# Patient Record
Sex: MALE | Race: WHITE
[De-identification: names, ages, dates, MRNs, and addresses within clinical notes are randomized per-mention and may not be internally consistent; named-entity substitution may affect disease eponyms.]

---

## 2020-09-21 ENCOUNTER — HOSPITAL ENCOUNTER (EMERGENCY)
Dept: HOSPITAL 7 - FB.ED | Age: 25
Discharge: HOME | End: 2020-09-21
Payer: COMMERCIAL

## 2020-09-21 DIAGNOSIS — E66.9: ICD-10-CM

## 2020-09-21 DIAGNOSIS — H16.133: Primary | ICD-10-CM

## 2020-09-21 NOTE — EDM.PDOC
ED HPI GENERAL MEDICAL PROBLEM





- General


Chief Complaint: Eye Problems


Stated Complaint: eyes


Time Seen by Provider: 09/21/20 03:00


Source of Information: Reports: Patient


History Limitations: Reports: No Limitations





- History of Present Illness


INITIAL COMMENTS - FREE TEXT/NARRATIVE: 





Shon awoke this am with pain, burning, and excessive tearing of eyes. He 

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 There is no other known exposure. He has tried no meds. 


  ** Bilateral Eye


Pain Score (Numeric/FACES): 5





- Related Data


                                    Allergies











Allergy/AdvReac Type Severity Reaction Status Date / Time


 


No Known Allergies Allergy   Verified 09/21/20 02:38











Home Meds: 


                                    Home Meds





NK [No Known Home Meds]  09/21/20 [History]











Past Medical History





- Past Health History


Medical/Surgical History: Denies Medical/Surgical History





Social & Family History





- Tobacco Use


Smoking Status *Q: Never Smoker





ED ROS GENERAL





- Review of Systems


Review Of Systems: Comprehensive ROS is negative, except as noted in HPI.





ED EXAM GENERAL W FULL EYE





- Physical Exam


Exam: See Below


Exam Limited By: Physical Impairment


General Appearance: Alert, WD/WN, Mild Distress, Obese


Eye Exam: Bilateral Eye: Conjunctival Injection, EOMI, Normal Fundi, PERRL


Visual Acuity (R) 20/: 20


Visual Acuity (L) 20/: 20


With Correction: No


Eyelids: Bilateral: Normal Appearance


Conjunctiva & Sclera: Bilateral: Conjunctival Edema, Injected


Cornea Exam: Bilateral: Normal Appearance, Examined with Flourescein


Extraocular Movements: Bilateral: Intact


Pupils: Normal Accommodation


Pupillary Size: Bilateral: 3 mm


Pupillary Reaction: Bilateral: Brisk


Anterior Chamber: Bilateral: Normal Appearance


Posterior Chamber: Bilateral: Normal Funduscopic


Ears: Normal External Exam


Nose: Normal Inspection


Throat/Mouth: Normal Inspection, Normal Oropharynx


Head: Normocephalic


Neck: Normal Inspection


Respiratory/Chest: Lungs Clear


Cardiovascular: Regular Rate, Rhythm


Back Exam: Normal Inspection


Extremities: Normal Inspection


Neurological: Alert, Oriented, CN II-XII Intact, Normal Cognition


Skin Exam: Warm, Dry, Intact, Normal Color


Lymphatic: No Adenopathy





Course





- Vital Signs


Text/Narrative:: 





Following assessment of both eyes, I administered 1 gtt of 1% Cyclogyl, Triple 

Antibx Oph Oint, and patched both eyes for the next 6 hours. 


Last Recorded V/S: 





                                Last Vital Signs











Temp  36.6 C   09/21/20 02:43


 


Pulse  66   09/21/20 02:43


 


Resp  16   09/21/20 02:43


 


BP  132/85   09/21/20 02:43


 


Pulse Ox  98   09/21/20 02:43














Departure





- Departure


Time of Disposition: 03:19


Disposition: Home, Self-Care 01


Condition: Fair


Clinical Impression: 


 Welders' keratitis of both eyes








- Discharge Information


*PRESCRIPTION DRUG MONITORING PROGRAM REVIEWED*: Not Applicable


*COPY OF PRESCRIPTION DRUG MONITORING REPORT IN PATIENT DAYSI: Not Applicable


Instructions:  Ultraviolet Keratitis, Easy-to-Read


Referrals: 


PCP,None [Primary Care Provider] - 


Forms:  ED Department Discharge


Additional Instructions: 


Leave patches on for 6 hours.


Utilize eye ointment as needed once to twice daily.


Ibuprofen 800mg every 8 hours as needed for pain. 


Continue to wear sunglasses. 





Sepsis Event Note (ED)





- Evaluation


Sepsis Screening Result: No Definite Risk





- Focused Exam


Vital Signs: 





                                   Vital Signs











  Temp Pulse Resp BP Pulse Ox


 


 09/21/20 02:43  36.6 C  66  16  132/85  98














- Problem List & Annotations


(1) Welders' keratitis of both eyes


SNOMED Code(s): 0285051


   Code(s): H16.133 - PHOTOKERATITIS, BILATERAL   Status: Acute   Current Visit:

Yes   Annotation/Comment:: He may remove patches in 6 hours, take Ibufprofen 600

mg po every 6 hurs, wear sun glsses, and reapply Triple Antibx Oph Oint if 

soothing.    





- Problem List Review


Problem List Initiated/Reviewed/Updated: Yes





- Assessment/Plan


Plan: 





Follow up with PCP or OD if needed.